# Patient Record
Sex: MALE | Race: WHITE | HISPANIC OR LATINO | URBAN - NONMETROPOLITAN AREA
[De-identification: names, ages, dates, MRNs, and addresses within clinical notes are randomized per-mention and may not be internally consistent; named-entity substitution may affect disease eponyms.]

---

## 2020-01-07 ENCOUNTER — OFFICE VISIT (OUTPATIENT)
Dept: UROLOGY | Facility: CLINIC | Age: 30
End: 2020-01-07

## 2020-01-07 VITALS — WEIGHT: 167 LBS | HEIGHT: 67 IN | BODY MASS INDEX: 26.21 KG/M2

## 2020-01-07 DIAGNOSIS — Z98.52 VASECTOMY STATUS: Primary | ICD-10-CM

## 2020-01-07 PROCEDURE — 99203 OFFICE O/P NEW LOW 30 MIN: CPT | Performed by: UROLOGY

## 2020-01-07 RX ORDER — CEPHALEXIN 500 MG/1
500 CAPSULE ORAL 2 TIMES DAILY
Qty: 8 CAPSULE | Refills: 0 | Status: SHIPPED | OUTPATIENT
Start: 2020-01-07 | End: 2020-01-11

## 2020-01-07 RX ORDER — DIAZEPAM 10 MG/1
TABLET ORAL
Qty: 2 TABLET | Refills: 0 | Status: SHIPPED | OUTPATIENT
Start: 2020-01-07

## 2020-01-07 NOTE — PROGRESS NOTES
Chief Complaint:          Chief Complaint   Patient presents with   • Vas consult     New pt       HPI:   29 y.o. male with 2 children who is desirous of an elective scrotal vasectomy.  Patient presents for consideration of an elective scrotal vasectomy.  I discussed the procedure at length.  I discussed the fact that it has a risk of anesthesia, bleeding, infection, testicular pain postoperatively, a failure in the range of 1 in 10,000.  The important necessity to have a follow-up in about 8 weeks after the original procedure to be sure that the semen specimen is free of spermatozoa thus ensuring sterility.  I discussed the various forms out there including a 1 incision, 2 incision technique as well as the after extensive discussion they wish to proceed      Past Medical History:      History reviewed. No pertinent past medical history.      Current Meds:     No current outpatient medications on file.     No current facility-administered medications for this visit.         Allergies:      Allergies no known allergies     Past Surgical History:     History reviewed. No pertinent surgical history.      Social History:     Social History     Socioeconomic History   • Marital status: Single     Spouse name: Not on file   • Number of children: Not on file   • Years of education: Not on file   • Highest education level: Not on file   Tobacco Use   • Smoking status: Never Smoker   • Smokeless tobacco: Never Used   Substance and Sexual Activity   • Alcohol use: Yes   • Drug use: Never       Family History:     Family History   Problem Relation Age of Onset   • No Known Problems Father    • No Known Problems Mother        Review of Systems:     Review of Systems   Constitutional: Negative.    HENT: Negative.    Eyes: Negative.    Respiratory: Negative.    Cardiovascular: Negative.    Gastrointestinal: Negative.    Endocrine: Negative.    Musculoskeletal: Negative.    Allergic/Immunologic: Negative.    Neurological:  Negative.    Hematological: Negative.    Psychiatric/Behavioral: Negative.        Physical Exam:     Physical Exam   Constitutional: He is oriented to person, place, and time. He appears well-developed and well-nourished.   HENT:   Head: Normocephalic and atraumatic.   Eyes: Pupils are equal, round, and reactive to light. Conjunctivae and EOM are normal.   Neck: Normal range of motion.   Cardiovascular: Normal rate, regular rhythm, normal heart sounds and intact distal pulses.   Pulmonary/Chest: Effort normal and breath sounds normal.   Abdominal: Soft. Bowel sounds are normal.   Musculoskeletal: Normal range of motion.   Neurological: He is alert and oriented to person, place, and time. He has normal reflexes.   Skin: Skin is warm and dry.   Psychiatric: He has a normal mood and affect. His behavior is normal. Judgment and thought content normal.   Nursing note and vitals reviewed.      I have reviewed the following portions of the patient's history: allergies, current medications, past family history, past medical history, past social history, past surgical history, problem list and ROS and confirm it's accurate.      Procedure:       Assessment/Plan:   Vasectomy status- patient presents for consideration of an elective scrotal vasectomy.  I discussed the procedure at length.  I discussed the fact that it has a risk of anesthesia, bleeding, infection, testicular pain postoperatively, a failure in the range of 1 in 10,000.  The important necessity to have a follow-up in about 8 weeks after the original procedure to be sure that the semen specimen is free of spermatozoa thus ensuring sterility.  I discussed the various forms out there including a 1 incision, 2 incision technique as well as the after extensive discussion they wish to proceed            Patient's Body mass index is 26.16 kg/m². BMI is above normal parameters. Recommendations include: educational material.              This document has been  electronically signed by JUANCHO CHAN MD January 7, 2020 8:59 AM

## 2020-01-08 PROBLEM — Z98.52 VASECTOMY STATUS: Status: ACTIVE | Noted: 2020-01-08
